# Patient Record
Sex: MALE | Race: WHITE | NOT HISPANIC OR LATINO | Employment: FULL TIME | URBAN - METROPOLITAN AREA
[De-identification: names, ages, dates, MRNs, and addresses within clinical notes are randomized per-mention and may not be internally consistent; named-entity substitution may affect disease eponyms.]

---

## 2024-01-28 ENCOUNTER — APPOINTMENT (OUTPATIENT)
Dept: RADIOLOGY | Facility: HOSPITAL | Age: 49
End: 2024-01-28
Payer: COMMERCIAL

## 2024-01-28 ENCOUNTER — HOSPITAL ENCOUNTER (EMERGENCY)
Facility: HOSPITAL | Age: 49
Discharge: HOME | End: 2024-01-28
Payer: COMMERCIAL

## 2024-01-28 VITALS
RESPIRATION RATE: 16 BRPM | OXYGEN SATURATION: 99 % | DIASTOLIC BLOOD PRESSURE: 99 MMHG | SYSTOLIC BLOOD PRESSURE: 146 MMHG | HEART RATE: 61 BPM

## 2024-01-28 DIAGNOSIS — S82.832A CLOSED AVULSION FRACTURE OF DISTAL END OF LEFT FIBULA, INITIAL ENCOUNTER: Primary | ICD-10-CM

## 2024-01-28 PROCEDURE — 73610 X-RAY EXAM OF ANKLE: CPT | Mod: LEFT SIDE | Performed by: RADIOLOGY

## 2024-01-28 PROCEDURE — 73610 X-RAY EXAM OF ANKLE: CPT | Mod: LT

## 2024-01-28 PROCEDURE — 99283 EMERGENCY DEPT VISIT LOW MDM: CPT

## 2024-01-28 ASSESSMENT — PAIN SCALES - GENERAL
PAINLEVEL_OUTOF10: 0 - NO PAIN
PAINLEVEL_OUTOF10: 5 - MODERATE PAIN

## 2024-01-28 ASSESSMENT — PAIN DESCRIPTION - LOCATION: LOCATION: ANKLE

## 2024-01-28 ASSESSMENT — PAIN - FUNCTIONAL ASSESSMENT: PAIN_FUNCTIONAL_ASSESSMENT: 0-10

## 2024-01-28 ASSESSMENT — COLUMBIA-SUICIDE SEVERITY RATING SCALE - C-SSRS
2. HAVE YOU ACTUALLY HAD ANY THOUGHTS OF KILLING YOURSELF?: NO
1. IN THE PAST MONTH, HAVE YOU WISHED YOU WERE DEAD OR WISHED YOU COULD GO TO SLEEP AND NOT WAKE UP?: NO
6. HAVE YOU EVER DONE ANYTHING, STARTED TO DO ANYTHING, OR PREPARED TO DO ANYTHING TO END YOUR LIFE?: NO

## 2024-01-28 ASSESSMENT — PAIN DESCRIPTION - ORIENTATION: ORIENTATION: LEFT

## 2024-01-28 NOTE — Clinical Note
Romeo Elias was seen and treated in our emergency department on 1/28/2024.  He may return to work on 01/30/2024.  Light duty.  Wear orthopedic boot with limited weightbearing activity.     If you have any questions or concerns, please don't hesitate to call.      Marco A Stephen PA-C

## 2024-01-28 NOTE — Clinical Note
Romeo Eilas was seen and treated in our emergency department on 1/28/2024.  He may return to work on 01/30/2024.  Light duty.  Wear orthopedic boot with limited weightbearing activity.     If you have any questions or concerns, please don't hesitate to call.      Marco A Stephen PA-C

## 2024-01-28 NOTE — ED PROVIDER NOTES
HPI   Chief Complaint   Patient presents with    Ankle Pain     Pt states that he fell out of the truck yesterday at work and hurt his left ankle. Pt rated his pain a 5-7/10 and 600mg of motrin at ~04:30. Msp's intact, no radiation        48-year-old male presents with left ankle injury.  Occurred yesterday.  Getting out of the truck rolled ankle.  Pain lateral aspect of the ankle.  He is ambulatory.  No other complaints of pain or injury.      History provided by:  Patient   used: No                        Germain Coma Scale Score: 15                  Patient History   No past medical history on file.  No past surgical history on file.  No family history on file.  Social History     Tobacco Use    Smoking status: Not on file    Smokeless tobacco: Not on file   Substance Use Topics    Alcohol use: Not on file    Drug use: Not on file       Physical Exam   ED Triage Vitals [01/28/24 0710]   Temp Heart Rate Respirations BP   -- 84 16 (!) 157/99      Pulse Ox Temp src Heart Rate Source Patient Position   99 % -- Monitor Standing      BP Location FiO2 (%)     Left arm --       Physical Exam  Vitals and nursing note reviewed.   Constitutional:       General: He is not in acute distress.     Appearance: Normal appearance. He is normal weight. He is not ill-appearing, toxic-appearing or diaphoretic.   HENT:      Head: Normocephalic.   Cardiovascular:      Rate and Rhythm: Normal rate.      Pulses: Normal pulses.   Pulmonary:      Effort: Pulmonary effort is normal.   Musculoskeletal:         General: Swelling and tenderness present. No deformity.      Left lower leg: Normal. No edema.      Left ankle: Swelling present. No deformity, ecchymosis or lacerations. Tenderness present over the lateral malleolus. No medial malleolus tenderness. Normal range of motion. Normal pulse.      Left Achilles Tendon: Normal.      Left foot: Normal.   Skin:     General: Skin is warm and dry.      Capillary Refill:  Capillary refill takes less than 2 seconds.      Findings: No bruising, erythema, lesion or rash.   Neurological:      General: No focal deficit present.      Mental Status: He is alert and oriented to person, place, and time.   Psychiatric:         Mood and Affect: Mood normal.         Behavior: Behavior normal.         ED Course & MDM   ED Course as of 01/28/24 0822   Sun Jan 28, 2024   0804 X-ray report reviewed.  Small avulsion fracture distal fibula [GA]      ED Course User Index  [GA] Marco A Stephen PA-C         Diagnoses as of 01/28/24 0822   Closed avulsion fracture of distal end of left fibula, initial encounter     XR ankle left 3+ views   Final Result   Nondisplaced avulsion fracture fibular tip left ankle with associated   lateral malleolar soft tissue swelling.             MACRO:   None        Signed by: Meron Cleveland 1/28/2024 7:40 AM   Dictation workstation:   [id number]          Medical Decision Making  Left ankle injury: Sprain strain fracture dislocation  X-ray: Nondisplaced small avulsion fracture tip of the lateral malleolus.  Soft tissue swelling.  Patient had taken ibuprofen prior to arrival.    Evaluation consistent with avulsion fracture tip of distal fibula with lateral soft tissue swelling left ankle.  Ortho boot, crutches if needed  Outpatient referrals for orthopedics and occupational health services.  Normal neurovascular exam.  Stable for discharge outpatient management.            Amount and/or Complexity of Data Reviewed  Radiology: ordered. Decision-making details documented in ED Course.        Procedure  Procedures     Marco A Stephen PA-C  01/28/24 7604

## 2024-02-01 ENCOUNTER — TELEPHONE (OUTPATIENT)
Dept: ORTHOPEDIC SURGERY | Facility: HOSPITAL | Age: 49
End: 2024-02-01
Payer: COMMERCIAL

## 2024-02-01 NOTE — TELEPHONE ENCOUNTER
Anat asked me to call patient and try to get them rescheduled with a foot and ankle provider, as she doesn't typically see ankles.  Had to leave a voicemail explaining that to the patient. Left both our office number and the central scheduling number along with the names of Dr. Cooley and Dr. Canales to call and try to get scheduled with one of them. CT

## 2024-02-02 ENCOUNTER — APPOINTMENT (OUTPATIENT)
Dept: ORTHOPEDIC SURGERY | Facility: HOSPITAL | Age: 49
End: 2024-02-02
Payer: COMMERCIAL

## 2024-02-07 ENCOUNTER — HOSPITAL ENCOUNTER (OUTPATIENT)
Dept: RADIOLOGY | Facility: CLINIC | Age: 49
Discharge: HOME | End: 2024-02-07
Payer: COMMERCIAL

## 2024-02-07 ENCOUNTER — OFFICE VISIT (OUTPATIENT)
Dept: ORTHOPEDIC SURGERY | Facility: CLINIC | Age: 49
End: 2024-02-07
Payer: COMMERCIAL

## 2024-02-07 VITALS — WEIGHT: 212 LBS | BODY MASS INDEX: 30.35 KG/M2 | HEIGHT: 70 IN

## 2024-02-07 DIAGNOSIS — M25.572 LEFT ANKLE PAIN, UNSPECIFIED CHRONICITY: ICD-10-CM

## 2024-02-07 DIAGNOSIS — S93.492A SPRAIN OF ANTERIOR TALOFIBULAR LIGAMENT OF LEFT ANKLE, INITIAL ENCOUNTER: Primary | ICD-10-CM

## 2024-02-07 PROCEDURE — 73610 X-RAY EXAM OF ANKLE: CPT | Mod: LEFT SIDE | Performed by: RADIOLOGY

## 2024-02-07 PROCEDURE — 73610 X-RAY EXAM OF ANKLE: CPT | Mod: LT

## 2024-02-07 PROCEDURE — L1902 AFO ANKLE GAUNTLET PRE OTS: HCPCS | Performed by: ORTHOPAEDIC SURGERY

## 2024-02-07 PROCEDURE — 99204 OFFICE O/P NEW MOD 45 MIN: CPT | Performed by: ORTHOPAEDIC SURGERY

## 2024-02-07 ASSESSMENT — PAIN DESCRIPTION - DESCRIPTORS: DESCRIPTORS: ACHING

## 2024-02-07 ASSESSMENT — PAIN SCALES - GENERAL: PAINLEVEL_OUTOF10: 3

## 2024-02-07 ASSESSMENT — PAIN - FUNCTIONAL ASSESSMENT: PAIN_FUNCTIONAL_ASSESSMENT: 0-10

## 2024-02-07 NOTE — PROGRESS NOTES
48-year-old male admitted fell out of his truck back on January 27.  He rolled his left ankle.  He was able to walk after that and finished his shift.  He noted increased pain in his ankle at the end of his shift in the morning and went to [facility].  He was evaluated at that time.  He was placed into a boot.  Pain has improved.  No previous injury to his ankle.  May be some athletic injuries but never requiring any treatment.  Pain is much improved.    On exam:  WD/WN athletic male  A+O X3  NAD  No lymphedema  Inspection of both feet and ankles show symmetric arches.   Able to STR bilaterally.   Mild tenderness over lateral gutter.  No crepitus.  5/5 strength in all 4 planes.   Sensation intact to LT.   Good pulses.   Stable anterior drawer.  No peroneal subluxation.    (-) [place].     I personally reviewed the following radiographic exams: Injury x-ray unremarkable.  X-ray report suggest small avulsion fracture.  Repeat x-rays today shows no fracture.  Normal joint space.  No OCD.    Assessment: Left ankle sprain.    Plan: Discussed nonoperative and operative options in detail.   Risk and benefits discussed in detail. All questions answered today.  Recovery timeline and expectations discussed in detail.  Can transition from boot.  Will apply for therapy.  Can try ankle brace for support as he transitions into supportive shoe wear.  Follow-up in 2 weeks.  Can return to work when he regains full strength and flexibility.

## 2024-02-08 ENCOUNTER — APPOINTMENT (OUTPATIENT)
Dept: ORTHOPEDIC SURGERY | Facility: CLINIC | Age: 49
End: 2024-02-08
Payer: COMMERCIAL

## 2024-02-21 ENCOUNTER — EVALUATION (OUTPATIENT)
Dept: PHYSICAL THERAPY | Facility: CLINIC | Age: 49
End: 2024-02-21
Payer: COMMERCIAL

## 2024-02-21 DIAGNOSIS — S93.492A SPRAIN OF ANTERIOR TALOFIBULAR LIGAMENT OF LEFT ANKLE, INITIAL ENCOUNTER: ICD-10-CM

## 2024-02-21 DIAGNOSIS — S93.492A SPRAIN OF OTHER LIGAMENT OF LEFT ANKLE, INITIAL ENCOUNTER: ICD-10-CM

## 2024-02-21 PROCEDURE — 97110 THERAPEUTIC EXERCISES: CPT | Mod: GP | Performed by: PHYSICAL THERAPIST

## 2024-02-21 PROCEDURE — 97161 PT EVAL LOW COMPLEX 20 MIN: CPT | Mod: GP | Performed by: PHYSICAL THERAPIST

## 2024-02-21 ASSESSMENT — ENCOUNTER SYMPTOMS
LOSS OF SENSATION IN FEET: 0
DEPRESSION: 0
OCCASIONAL FEELINGS OF UNSTEADINESS: 0

## 2024-02-21 NOTE — PROGRESS NOTES
Physical Therapy    Physical Therapy Evaluation    Patient Name: Romeo Elias  MRN: [id number]  Today's Date: 02/21/24  Time Calculation  Start Time: 0145  Stop Time: 0230  Time Calculation (min): 45 min     Insurance:  Visit number: 1 of 12  Insurance Type: Payor: THREE HAB / Plan: THREE HAB / Product Type: *No Product type* /   Authorization or Plan of Care date Range: 2/9/24 to 4/12/24    Therapy diagnoses:   1. Sprain of anterior talofibular ligament of left ankle, initial encounter  Referral to Physical Therapy    Follow Up In Physical Therapy      2. Sprain of other ligament of left ankle, initial encounter  PT eval and treat    Follow Up In Physical Therapy         General:  Reason for visit: L ankle sprain/avulsion fracture DOI: 1/27/24   Referred by: MD Ebonie Miner MD appt: none scheduled      Precautions:  none  Fall Risk: Low     Assessment:   Patient presents s/p healing ankle fracture/sprain. Will benefit from skilled PT in order to decrease pain and improve function to return to work full duty    Impairments: Pain, Active ROM, Passive ROM, Strength, Activity limitations, Flexibility, Joint mobility, Decreased functional level, and Participation restrictions    Functional Limitations: Walking, Stair negotiation, Working, and Standing    Recommended Treatment:  Therapeutic exercise, Manual therapy, Gait training, Home program instruction and progression, Neuromuscular re-education, Therapeutic activities, Self care and home management, Instruction in activity modification, Electrical stimulation, and Vasopneumatic device + cold      Plan:  Plan of care was developed with input and agreement by the patient.  2x per week for 4-6 weeks    Rehab Potential: Good to achieve goals.    Goals:  - Patient to be Indep in [facility] for self management of symptoms    - LEFS: 70/80 to indicate a significant improvement in overall function.      - Patient will demo 5/5 ankle strength (all planes) to allow for correct  "gait and stair mechanics     - Patient will demo mild to no limitation AROM left ankle to allow for correct mechanics with functional mobility.    - Patient to demonstrate gait with least restrictive device for all ADLS and does not demonstrate significant deviations that may contribute to discomfort in the future    - Patient to negotiate stairs reciprocally and descend with near equal eccentric control without use of hand rail.     - Improve Timed Single leg Stance by 8 seconds (MDC = 8 seconds)     Subjective:  - CC:  L lateral ankle pain.   - JOHNATHAN:  rolled ankle falling out of truck  - DOI/onset: 1/27/24  - PAIN - Location: lateral ankle  Worst(past 24 hours): 1-2/10   - PAIN - Alleviating: ice, NSAID's as needed  Aggravating: walking and stair climbing  - CURRENT MEDICAL MANAGEMENT: no medications  - PLOF: no prior injuries   - FUNCTIONAL LIMITATIONS: walking, stair climbing, prior work duties   - LIVING ENVIRONMENT: no barriers  - WORK: , off duty  - EXERCISE: walking dogs  - Patient stated goal: return to work    Medical History Form: Reviewed (scanned into chart)       Objective:   GAIT: Independent. no device Mildly antalgic  STAIRS:   Ascends and descends stairs Independently.    PALPATION: Lateral malleolus tenderness    SENSATION: Intact      ROM:  ANKLE ROM: 0 degrees dorsiflexion  40 degrees plantarflexion  30 degrees inversion  12 degrees eversion    FLEXIBILITY:  Gastroc R/L: WFL / Moderate limitation     STRENGTH:  Manual Muscle Test Ankle: Dorsiflexion R/L: 5 / 4  Plantarflexion R/L: 5 / 4  Eversion R/L: 5 / 4  Inversion R/L: 5 / 4    BALANCE  10s SLS on stable surface eyes open    Outcome Measures:  Other Measures  Lower Extremity Funtional Score (LEFS): 39/80     Treatment Performed: (\"NP\" = Not Performed)     HEP: Access Code: [email or web address]    Therapeutic Exercise:     15 minutes  Access Code: [email or web address]  URL: [email or web address]  Date: 02/21/2024  Prepared by: Domenico" Nan    Exercises  - Long Sitting Calf Stretch with Strap  - 1 x daily - 7 x weekly - 3 reps - 30 hold  - Standing Gastroc Stretch on Step  - 1 x daily - 7 x weekly - 3 reps - 30 hold  - Long Sitting Ankle Dorsiflexion with Anchored Resistance  - 1 x daily - 7 x weekly - 2 sets - 10 reps  - Long Sitting Ankle Plantar Flexion with Resistance  - 1 x daily - 7 x weekly - 2 sets - 10 reps  - Long Sitting Ankle Eversion with Resistance  - 1 x daily - 7 x weekly - 2 sets - 10 reps  - Long Sitting Ankle Inversion with Resistance  - 1 x daily - 7 x weekly - 2 sets - 10 reps  - Ankle Inversion Eversion Towel Slide  - 1 x daily - 7 x weekly - 2 sets - 10 reps  - Standing Ankle Dorsiflexion Stretch on Chair  - 1 x daily - 7 x weekly - 3 reps - 30 hold  - Isometric Ankle Inversion  - 1 x daily - 7 x weekly - 2 sets - 10 reps - 5 hold  - Isometric Ankle Eversion at Wall  - 1 x daily - 7 x weekly - 2 sets - 10 reps - 5 hold    Manual Therapy:       minutes      Neuromuscular Re-education:      minutes      Gait Training:            minutes      Therapeutic Activity:      minutes      Modalities:       Vasopneumatic Device       minutes  Electrical Stimulation          minutes  Ultrasound            minutes  Iontophoresis                     minutes  Cold Pack            minutes  Mechanical Traction           minutes    Evaluation Complexity: Low: 30 minutes; Moderate   minutes; Complex PT Evaluation Time Entry: 30;  minutes    Re-Evaluation:   minutes

## 2024-02-22 ENCOUNTER — APPOINTMENT (OUTPATIENT)
Dept: ORTHOPEDIC SURGERY | Facility: CLINIC | Age: 49
End: 2024-02-22
Payer: COMMERCIAL

## 2024-02-23 ENCOUNTER — TREATMENT (OUTPATIENT)
Dept: PHYSICAL THERAPY | Facility: CLINIC | Age: 49
End: 2024-02-23
Payer: COMMERCIAL

## 2024-02-23 DIAGNOSIS — S93.492A SPRAIN OF OTHER LIGAMENT OF LEFT ANKLE, INITIAL ENCOUNTER: ICD-10-CM

## 2024-02-23 DIAGNOSIS — S93.492A SPRAIN OF ANTERIOR TALOFIBULAR LIGAMENT OF LEFT ANKLE, INITIAL ENCOUNTER: ICD-10-CM

## 2024-02-23 PROCEDURE — 97110 THERAPEUTIC EXERCISES: CPT | Mod: GP | Performed by: PHYSICAL THERAPIST

## 2024-02-23 NOTE — PROGRESS NOTES
PHYSICAL THERAPY TREATMENT NOTE    Patient Name:  Romeo Elias   Patient MRN: [id number]  Date: 02/23/24  Time Calculation  Start Time: 1040  Stop Time: 1120  Time Calculation (min): 40 min    Insurance:  Visit number: 2 of 12  Insurance Type: Payor: [employer] HAB / Plan: THREE HAB / Product Type: *No Product type* /   Authorization or Plan of Care date Range: 2/9/24 to 4/12/24    Therapy diagnoses:   1. Sprain of anterior talofibular ligament of left ankle, initial encounter  Follow Up In Physical Therapy      2. Sprain of other ligament of left ankle, initial encounter  Follow Up In Physical Therapy         General:  Reason for visit: L ankle avulsion fracture/sprain DOI: 1/27/24   Referred by: Nader Vizcarra MD  Next MD appt:  none scheduled     Precautions:  none  Fall Risk: Low    Assessment:  Education: Reviewed home exercise program. Provided manual cues for correct performance of exercises. Provided verbal feedback to improve exercise technique.  Progress towards functional goals: Improved gait quality. Reduced pain level.  Response to interventions: Patient tolerated therapeutic interventions well and without any adverse events. Tolerated treatment session well without any increase in pain.  Justification for continued skilled care: To address remaining functional, objective and subjective deficits to allow them to return to full independence with ADLs. Modify and progress home exercise program. Progressive strengthening to stabilize the ankle to improve function.    Plan:  Exercises targeting surrounding musculature to stabilize the joint and improve function. Exercises to gradually increase flexibility and range of motion of the ankle. Manual therapy to improve joint mobility.    Subjective:   Romeo reports he feels like his condition is improving.  Doing well with initial HEP.   Pain  "(0-10): 1    HEP adherence / understanding: compliance with the instructed home exercises.    Objective:  Mildly antalgic with gait    Treatment Performed: (\"NP\" = Not Performed)     HEP: Access Code: [email or web address]     Therapeutic Exercise:     40 minutes  Stationary bike 5 min  Standing calf stretch 30s x 3  Foam balance tandem and SLS 30s x 3 ea  Standing contrakicks G TB x 20 ea  Sidestepping on foam 5 laps  BOSU mini lunge 2x10 F/L  Long sitting ankle TB 4 way  Performed a few min of ankle stretching in long sitting and joint distraction    Manual Therapy:       minutes      Neuromuscular Re-education:      minutes      Therapeutic Activity:      minutes      Gait Training:            minutes      Modalities:       Vasopneumatic Device       minutes  Electrical Stimulation          minutes  Ultrasound            minutes  Iontophoresis                     minutes  Cold Pack            minutes  Mechanical Traction           minutes    Evaluation Complexity: Low:   minutes; Moderate   minutes; Complex   minutes    Re-Evaluation:   minutes           "

## 2024-02-26 ENCOUNTER — APPOINTMENT (OUTPATIENT)
Dept: PHYSICAL THERAPY | Facility: CLINIC | Age: 49
End: 2024-02-26
Payer: COMMERCIAL

## 2024-02-26 ENCOUNTER — TREATMENT (OUTPATIENT)
Dept: PHYSICAL THERAPY | Facility: CLINIC | Age: 49
End: 2024-02-26
Payer: COMMERCIAL

## 2024-02-26 DIAGNOSIS — S93.492A SPRAIN OF ANTERIOR TALOFIBULAR LIGAMENT OF LEFT ANKLE, INITIAL ENCOUNTER: ICD-10-CM

## 2024-02-26 DIAGNOSIS — S93.492A SPRAIN OF OTHER LIGAMENT OF LEFT ANKLE, INITIAL ENCOUNTER: ICD-10-CM

## 2024-02-26 PROCEDURE — 97110 THERAPEUTIC EXERCISES: CPT | Mod: GP | Performed by: PHYSICAL THERAPIST

## 2024-02-26 NOTE — PROGRESS NOTES
"                                                                                                                         PHYSICAL THERAPY TREATMENT NOTE    Patient Name:  Romeo Elias   Patient MRN: [id number]  Date: 02/26/24  Time Calculation  Start Time: 1200  Stop Time: 1245  Time Calculation (min): 45 min    Insurance:  Visit number: 3 of 12  Insurance Type: Payor: THREE HAB / Plan: THREE HAB / Product Type: *No Product type* /   Authorization or Plan of Care date Range: 2/9/24 to 4/12/24    Therapy diagnoses:   1. Sprain of anterior talofibular ligament of left ankle, initial encounter  Follow Up In Physical Therapy      2. Sprain of other ligament of left ankle, initial encounter  Follow Up In Physical Therapy         General:  Reason for visit: L ankle avulsion fracture/sprain DOI: 1/27/24   Referred by: Nader Vizcarra MD  Next MD appt:  none scheduled     Precautions:  none  Fall Risk: Low    Assessment:  Education: Provided verbal feedback to improve exercise technique.  Progress towards functional goals: Reduced frequency of pain. Reduced intensity of pain.  Response to interventions: Patient tolerated therapeutic interventions well and without any adverse events. Tolerated treatment session well without any increase in pain. Patient was appropriately fatigued with no complaints.  Justification for continued skilled care: To address remaining functional, objective and subjective deficits to allow them to return to full independence with ADLs. Progressive strengthening to stabilize the ankle to improve function.    Plan:  Exercises targeting surrounding musculature to stabilize the joint and improve function.    Subjective:   Romeo reports he feels like his condition is improving.  Most discomfort top of the ankle, better on the sides   Pain (0-10): 1    HEP adherence / understanding: compliance with the instructed home exercises.    Objective:  Mildly antalgic with gait    Treatment Performed: (\"NP\" = Not " Performed)     HEP: Access Code: [email or web address]     Therapeutic Exercise:     45 minutes  Stationary bike 6 min  Standing calf stretch 30s x 3  Foam balance tandem and SLS 30s x 3 ea  Standing contrakicks R TB x 20 ea with foam  Sidestepping on foam 5 laps  BOSU mini lunge 2x10 F/L  BOSU F/L step up x 20 ea  Long sitting ankle TB 4 way  Performed a few min of ankle stretching in long sitting and joint distraction    Manual Therapy:       minutes      Neuromuscular Re-education:      minutes      Therapeutic Activity:      minutes      Gait Training:            minutes      Modalities:       Vasopneumatic Device       minutes  Electrical Stimulation          minutes  Ultrasound            minutes  Iontophoresis                     minutes  Cold Pack            minutes  Mechanical Traction           minutes    Evaluation Complexity: Low:   minutes; Moderate   minutes; Complex   minutes    Re-Evaluation:   minutes

## 2024-02-28 ENCOUNTER — TREATMENT (OUTPATIENT)
Dept: PHYSICAL THERAPY | Facility: CLINIC | Age: 49
End: 2024-02-28
Payer: COMMERCIAL

## 2024-02-28 DIAGNOSIS — S93.492A SPRAIN OF ANTERIOR TALOFIBULAR LIGAMENT OF LEFT ANKLE, INITIAL ENCOUNTER: ICD-10-CM

## 2024-02-28 DIAGNOSIS — S93.492A SPRAIN OF OTHER LIGAMENT OF LEFT ANKLE, INITIAL ENCOUNTER: ICD-10-CM

## 2024-02-28 PROCEDURE — 97110 THERAPEUTIC EXERCISES: CPT | Mod: GP | Performed by: PHYSICAL THERAPIST

## 2024-02-28 NOTE — PROGRESS NOTES
"                                                                                                                         PHYSICAL THERAPY TREATMENT NOTE    Patient Name:  Romeo Elias   Patient MRN: [id number]  Date: 02/28/24  Time Calculation  Start Time: 0145  Stop Time: 0230  Time Calculation (min): 45 min    Insurance:  Visit number: 4 of 12  Insurance Type: Payor: [employer] HAB / Plan: THREE HAB / Product Type: *No Product type* /   Authorization or Plan of Care date Range: 2/9/24 to 4/12/24    Therapy diagnoses:   1. Sprain of anterior talofibular ligament of left ankle, initial encounter  Follow Up In Physical Therapy      2. Sprain of other ligament of left ankle, initial encounter  Follow Up In Physical Therapy           General:  Reason for visit: L ankle avulsion fracture/sprain DOI: 1/27/24   Referred by: MD Ebonie Miner MD appt:  none scheduled     Precautions:  none  Fall Risk: Low    Assessment:  Education: Provided verbal feedback to improve exercise technique.  Progress towards functional goals: Improved gait quality. Reduced pain level.  Response to interventions: Tolerated treatment session well without any increase in pain. Improved tolerance to strengthening progressions.  Justification for continued skilled care: Progressive strengthening to stabilize the ankle to improve function.    Plan:  Exercises targeting surrounding musculature to stabilize the joint and improve function.    Subjective:   Romeo reports he feels like his condition is improving.     Pain (0-10): 0    HEP adherence / understanding: compliance with the instructed home exercises.    Objective:  Mildly antalgic with gait    Treatment Performed: (\"NP\" = Not Performed)     HEP: Access Code: [email or web address]     Therapeutic Exercise:     45 minutes  Stationary bike 6 min  Standing calf stretch 30s x 3  SLS x10 ea 3 way with ball toss  Standing contrakicks G TB x 20 ea with foam  Sidestepping/monster walk B TB 2 laps ea  BOSU mini " lunge 2x10 F/L  BOSU F/L step up x 20 ea  Long sitting ankle TB 4 way-NP  Performed a few min of ankle stretching in long sitting and joint distraction    Manual Therapy:       minutes      Neuromuscular Re-education:      minutes      Therapeutic Activity:      minutes      Gait Training:            minutes      Modalities:       Vasopneumatic Device       minutes  Electrical Stimulation          minutes  Ultrasound            minutes  Iontophoresis                     minutes  Cold Pack            minutes  Mechanical Traction           minutes    Evaluation Complexity: Low:   minutes; Moderate   minutes; Complex   minutes    Re-Evaluation:   minutes

## 2024-03-05 ENCOUNTER — TREATMENT (OUTPATIENT)
Dept: PHYSICAL THERAPY | Facility: CLINIC | Age: 49
End: 2024-03-05
Payer: COMMERCIAL

## 2024-03-05 DIAGNOSIS — S93.492A SPRAIN OF OTHER LIGAMENT OF LEFT ANKLE, INITIAL ENCOUNTER: ICD-10-CM

## 2024-03-05 DIAGNOSIS — S93.492A SPRAIN OF ANTERIOR TALOFIBULAR LIGAMENT OF LEFT ANKLE, INITIAL ENCOUNTER: ICD-10-CM

## 2024-03-05 PROBLEM — S93.402A SPRAIN OF LEFT ANKLE: Status: ACTIVE | Noted: 2024-03-05

## 2024-03-05 PROCEDURE — 97110 THERAPEUTIC EXERCISES: CPT | Mod: GP,CQ

## 2024-03-05 NOTE — PROGRESS NOTES
"                                                                                                                         PHYSICAL THERAPY TREATMENT NOTE    Patient Name:  Romeo Elias   Patient MRN: [id number]  Date: 03/05/24       Insurance:  Visit number: 4 of 12  Insurance Type: Payor: [employer] HAB / Plan: THREE HAB / Product Type: *No Product type* /   Authorization or Plan of Care date Range: 2/9/24 to 4/12/24    Therapy diagnoses:   1. Sprain of anterior talofibular ligament of left ankle, initial encounter  Follow Up In Physical Therapy      2. Sprain of other ligament of left ankle, initial encounter  Follow Up In Physical Therapy           General:  Reason for visit: L ankle avulsion fracture/sprain DOI: 1/27/24   Referred by: MD Ebonie Miner MD appt:  none scheduled     Precautions:  none  Fall Risk: Low    Assessment:  Education: Provided verbal feedback to improve exercise technique.  Progress towards functional goals: Improved gait quality. Reduced pain level.  Response to interventions: Tolerated treatment session well without any increase in pain. Improved tolerance to strengthening progressions.Stability exercises presented good challenge to pt. , needed to reach for renetta bar at times though no LOB  Justification for continued skilled care: Progressive strengthening to stabilize the ankle to improve function.    Plan:  Exercises targeting surrounding musculature to stabilize the joint and improve function.    Subjective:   Romeo reports he feels like his condition is improving. Did have some pain a couple of days ago at a 2, today is a 1.   Pain (0-10): 1    HEP adherence / understanding: compliance with the instructed home exercises.    Objective:  Normal gait    Treatment Performed: (\"NP\" = Not Performed)     HEP: Access Code: [email or web address]     Therapeutic Exercise:       minutes  Stationary bike 6 min  Standing calf stretch 30s x 3  SLS x10 ea 3 way with ball toss  Standing L SLB contra kicks  x " 20 ea with foam  Sidestepping/monster walk B TB 2 laps ea  BOSU mini lunge 2x10 F/L NP  BOSU F step up x 20 ea with RLE pull up  BOSU lateral step up over 20x   BOSU black side up  squats 20x w/light UE support   Long sitting ankle GTB 4 way 10x each   Shuttle LLE only 50 # 3 x 12  Shuttle LLE calf raise  25 #  x 12  Performed a few min of ankle stretching in long sitting and joint distraction np    Manual Therapy:       minutes      Education : Updated HEP , issued green tband

## 2024-03-08 ENCOUNTER — TREATMENT (OUTPATIENT)
Dept: PHYSICAL THERAPY | Facility: CLINIC | Age: 49
End: 2024-03-08
Payer: COMMERCIAL

## 2024-03-08 DIAGNOSIS — S93.492A SPRAIN OF ANTERIOR TALOFIBULAR LIGAMENT OF LEFT ANKLE, INITIAL ENCOUNTER: ICD-10-CM

## 2024-03-08 DIAGNOSIS — S93.492A SPRAIN OF OTHER LIGAMENT OF LEFT ANKLE, INITIAL ENCOUNTER: ICD-10-CM

## 2024-03-08 PROCEDURE — 97110 THERAPEUTIC EXERCISES: CPT | Mod: GP | Performed by: PHYSICAL THERAPIST

## 2024-03-08 NOTE — PROGRESS NOTES
"                                                                                                                         PHYSICAL THERAPY TREATMENT NOTE    Patient Name:  Romeo Elias   Patient MRN: [id number]  Date: 03/08/24  Time Calculation  Start Time: 1000  Stop Time: 1040  Time Calculation (min): 40 min    Insurance:  Visit number: 6 of 12  Insurance Type: Payor: [employer] / Plan: [employer] / Product Type: *No Product type* /   Authorization or Plan of Care date Range: 2/9/24 to 4/12/24    Therapy diagnoses:   1. Sprain of anterior talofibular ligament of left ankle, initial encounter  Follow Up In Physical Therapy      2. Sprain of other ligament of left ankle, initial encounter  Follow Up In Physical Therapy        General:  Reason for visit: L ankle avulsion fracture/sprain DOI: 1/27/24   Referred by: Nader Vizcarra MD  Next MD appt:  none scheduled     Precautions:  none  Fall Risk: Low    Assessment:  Education: Provided verbal feedback to improve exercise technique.  Progress towards functional goals: Improved gait quality. Improved walking distance. Reduced frequency of pain. Reduced intensity of pain.  Response to interventions: Tolerated treatment session well without any increase in pain. Improved tolerance to strengthening progressions.  Justification for continued skilled care: To address remaining functional, objective and subjective deficits to allow them to return to full independence with ADLs.    Plan:  Exercises targeting surrounding musculature to stabilize the joint and improve function.    Subjective:   Romeo reports he feels like his condition is improving.     Pain (0-10): 0    HEP adherence / understanding: compliance with the instructed home exercises.      Objective:  Normal gait    Treatment Performed: (\"NP\" = Not Performed)     HEP: Access Code: [email or web address]     Therapeutic Exercise:     40 minutes  Stationary bike 6 min  Standing calf stretch 30s x 3  SLS x10 ea 3 way with ball " toss  Standing L SLB contra kicks  x 20 ea with foam-NP  Sidestepping/monster walk B TB 2 laps ea-NP  BOSU mini lunge 2x10 F/L  BOSU F/L step up x 20 ea with RLE pull up  BOSU black side up  squats 20x w/light UE support   Long sitting ankle GTB 4 way 10x each -NP  Shuttle LLE only 50 # 2 x 15  Shuttle LLE calf raise  25 #  2x10  5 position cone placement and  RDL 2x  Performed a few min of ankle stretching in long sitting and joint distraction     Manual Therapy:       minutes      Education : Updated HEP , issued green tband

## 2024-03-12 ENCOUNTER — APPOINTMENT (OUTPATIENT)
Dept: PHYSICAL THERAPY | Facility: CLINIC | Age: 49
End: 2024-03-12
Payer: COMMERCIAL

## 2024-03-19 ENCOUNTER — DOCUMENTATION (OUTPATIENT)
Dept: PHYSICAL THERAPY | Facility: CLINIC | Age: 49
End: 2024-03-19
Payer: COMMERCIAL

## 2024-03-19 ENCOUNTER — APPOINTMENT (OUTPATIENT)
Dept: PHYSICAL THERAPY | Facility: CLINIC | Age: 49
End: 2024-03-19
Payer: COMMERCIAL

## 2024-03-19 NOTE — PROGRESS NOTES
Physical Therapy                 Therapy Communication Note    Patient Name: Romeo Elias  MRN: [id number]  Today's Date: 3/19/2024     Discipline: Physical Therapy    Missed Visit Reason: no reason     Missed Time: Cancel    Comment:

## 2024-03-22 ENCOUNTER — TREATMENT (OUTPATIENT)
Dept: PHYSICAL THERAPY | Facility: CLINIC | Age: 49
End: 2024-03-22
Payer: COMMERCIAL

## 2024-03-22 DIAGNOSIS — S93.492A SPRAIN OF OTHER LIGAMENT OF LEFT ANKLE, INITIAL ENCOUNTER: ICD-10-CM

## 2024-03-22 DIAGNOSIS — S93.492A SPRAIN OF ANTERIOR TALOFIBULAR LIGAMENT OF LEFT ANKLE, INITIAL ENCOUNTER: ICD-10-CM

## 2024-03-22 PROCEDURE — 97110 THERAPEUTIC EXERCISES: CPT | Mod: GP | Performed by: PHYSICAL THERAPIST

## 2024-03-22 NOTE — PROGRESS NOTES
"                                                                                                                         PHYSICAL THERAPY TREATMENT NOTE    Patient Name:  Romeo Elias   Patient MRN: [id number]  Date: 03/22/24  Time Calculation  Start Time: 0830  Stop Time: 0910  Time Calculation (min): 40 min    Insurance:  Visit number: 7 of 12  Insurance Type: Payor: [employer] / Plan: [employer] / Product Type: *No Product type* /   Authorization or Plan of Care date Range: 2/9/24 to 4/12/24    Therapy diagnoses:   1. Sprain of anterior talofibular ligament of left ankle, initial encounter  Follow Up In Physical Therapy      2. Sprain of other ligament of left ankle, initial encounter  Follow Up In Physical Therapy        General:  Reason for visit: L ankle avulsion fracture/sprain DOI: 1/27/24   Referred by: MD Ebonie Miner MD appt:  none scheduled     Precautions:  none  Fall Risk: Low    Assessment:  Education: Reviewed home exercise program.  Progress towards functional goals: Improved gait quality. Improved ability to complete household activities. Reduced frequency of pain. Reduced intensity of pain.  Response to interventions: Tolerated treatment session well without any increase in pain. Improved tolerance to strengthening progressions.  Justification for continued skilled care: To address remaining functional, objective and subjective deficits to allow them to return to full independence with ADLs. Progressive strengthening to stabilize the ankle to improve function.    Plan:  Exercises targeting surrounding musculature to stabilize the joint and improve function.    Subjective:   Romeo reports he feels like his condition is improving.     Pain (0-10): 0    HEP adherence / understanding: compliance with the instructed home exercises.    Objective:  Normal gait    Treatment Performed: (\"NP\" = Not Performed)     HEP: Access Code: [email or web address]     Therapeutic Exercise:     40 minutes  Stationary bike 6 " min  Standing calf stretch 30s x 3  SLS x10 ea 3 way with ball toss  Standing L SLB contra kicks  x 20 ea with foam-NP  Sidestepping/monster walk B TB 2 laps ea-NP  BOSU mini lunge 2x10 F/L  BOSU F/L step up x 20 ea with RLE pull up  BOSU black side up  squats 20x w/light UE support   Long sitting ankle GTB 4 way 10x each -NP  Shuttle LLE only 50 # 2 x 15  Shuttle LLE calf raise  25 #  2x10  5 position cone placement and  RDL 2x  Performed a few min of ankle stretching in long sitting and joint distraction     Manual Therapy:       minutes

## 2024-03-26 ENCOUNTER — TREATMENT (OUTPATIENT)
Dept: PHYSICAL THERAPY | Facility: CLINIC | Age: 49
End: 2024-03-26
Payer: COMMERCIAL

## 2024-03-26 DIAGNOSIS — S93.492A SPRAIN OF OTHER LIGAMENT OF LEFT ANKLE, INITIAL ENCOUNTER: ICD-10-CM

## 2024-03-26 DIAGNOSIS — S93.492A SPRAIN OF ANTERIOR TALOFIBULAR LIGAMENT OF LEFT ANKLE, INITIAL ENCOUNTER: ICD-10-CM

## 2024-03-26 PROCEDURE — 97110 THERAPEUTIC EXERCISES: CPT | Mod: GP,CQ

## 2024-03-26 NOTE — PROGRESS NOTES
"                                                                                                                         PHYSICAL THERAPY TREATMENT NOTE    Patient Name:  Romeo Elias   Patient MRN: [id number]  Date: 03/26/24  Time Calculation  Start Time: 0830  Stop Time: 0915  Time Calculation (min): 45 min    Insurance:  Visit number: 7 of 12  Insurance Type: Payor: [employer] / Plan: [employer] / Product Type: *No Product type* /   Authorization or Plan of Care date Range: 2/9/24 to 4/12/24    Therapy diagnoses:   1. Sprain of anterior talofibular ligament of left ankle, initial encounter  Follow Up In Physical Therapy      2. Sprain of other ligament of left ankle, initial encounter  Follow Up In Physical Therapy        General:  Reason for visit: L ankle avulsion fracture/sprain DOI: 1/27/24   Referred by: MD Ebonie Miner MD appt:  none scheduled     Precautions:  none  Fall Risk: Low    Assessment:  Education: Reviewed home exercise program.  Progress towards functional goals: Improved gait quality. Improved ability to complete household activities. Reduced frequency of pain. Reduced intensity of pain.  Response to interventions: Tolerated treatment session well without any increase in pain. Improved tolerance to strengthening progressions.  Justification for continued skilled care: To address remaining functional, objective and subjective deficits to allow them to return to full independence with ADLs. Progressive strengthening to stabilize the ankle to improve function.    Plan:  Exercises targeting surrounding musculature to stabilize the joint and improve function.    Subjective:   Romeo reports he feels like his condition is improving.  Overall 90% improved since start of therapy.   Pain (0-10): 0    HEP adherence / understanding: compliance with the instructed home exercises.    Objective:  Normal gait    Treatment Performed: (\"NP\" = Not Performed)     HEP: Access Code: [email or web address]     Therapeutic " "Exercise:     45 minutes  Stationary bike 6 min  Standing calf stretch 30s x 3  SLS x10 ea 3 way with 4# ball forward push outs  10x , vertical push ups 10x ,rotation 10x each side  Standing L SLB contra kicks  x 20 ea with foam-NP  Sidestepping/monster walk B TB 2 laps ea-NP  BOSU mini lunge 2x10 F/L np  BOSU F/L step up x 20 ea with RLE pull up with 5# each hand   Dips 4\" 2 x 10   Forward heel tap 4\" 2 x 10   BOSU black side up  squats 20x w/light UE support   Cable walking 15#  4 way 5x each way  Long sitting ankle GTB 4 way 10x each -NP  Shuttle LLE only 56 # 2 x 15  Shuttle LLE calf raise  31 #  2x10  5 position cone placement and  RDL 2x  np  Performed a few min of ankle stretching in long sitting and joint distraction np    Manual Therapy:       minutes               "

## 2024-03-29 ENCOUNTER — TREATMENT (OUTPATIENT)
Dept: PHYSICAL THERAPY | Facility: CLINIC | Age: 49
End: 2024-03-29
Payer: COMMERCIAL

## 2024-03-29 DIAGNOSIS — S93.492A SPRAIN OF ANTERIOR TALOFIBULAR LIGAMENT OF LEFT ANKLE, INITIAL ENCOUNTER: ICD-10-CM

## 2024-03-29 DIAGNOSIS — S93.492A SPRAIN OF OTHER LIGAMENT OF LEFT ANKLE, INITIAL ENCOUNTER: ICD-10-CM

## 2024-03-29 PROCEDURE — 97110 THERAPEUTIC EXERCISES: CPT | Mod: GP | Performed by: PHYSICAL THERAPIST

## 2024-03-29 NOTE — PROGRESS NOTES
"                                                                                                                         PHYSICAL THERAPY TREATMENT NOTE    Patient Name:  Romeo Elias   Patient MRN: [id number]  Date: 03/29/24  Time Calculation  Start Time: 0830  Stop Time: 0910  Time Calculation (min): 40 min    Insurance:  Visit number: 8 of 12  Insurance Type: Payor: [employer] / Plan: [employer] / Product Type: *No Product type* /   Authorization or Plan of Care date Range: 2/9/24 to 4/12/24    Therapy diagnoses:   1. Sprain of anterior talofibular ligament of left ankle, initial encounter  Follow Up In Physical Therapy      2. Sprain of other ligament of left ankle, initial encounter  Follow Up In Physical Therapy        General:  Reason for visit: L ankle avulsion fracture/sprain DOI: 1/27/24   Referred by: MD Ebonie Miner MD appt:  none scheduled     Precautions:  none  Fall Risk: Low    Assessment:  Education: Reviewed home exercise program.  Progress towards functional goals: Improved gait quality. Improved walking distance. Reduced pain level.  Response to interventions: Tolerated treatment session well without any increase in pain.  Justification for continued skilled care: To address remaining functional, objective and subjective deficits to allow them to return to full independence with ADLs.    Plan:  Discharge from PT    Subjective:   Romeo reports he feels like his condition is improving.     Pain (0-10): 0    HEP adherence / understanding: compliance with the instructed home exercises.    Objective:  Normal gait, strength, and ROM    Treatment Performed: (\"NP\" = Not Performed)     HEP: Access Code: [email or web address]     Therapeutic Exercise:     40 minutes  Stationary bike 6 min  Standing calf stretch 30s x 3  SLS x10 ea 3 way with 4# ball forward push outs  10x , vertical push ups 10x ,rotation 10x each side  Standing L SLB contra kicks  x 20 ea with foam-NP  Sidestepping/monster walk B TB 2 laps " "ea-NP  BOSU mini lunge 2x10 F/L np  BOSU F/L step up x 20 ea with RLE pull up with 5# each hand   Dips 4\" 2 x 10   Forward heel tap 4\" 2 x 10   BOSU black side up  squats 20x w/light UE support   Cable walking 15#  4 way 5x each way  Long sitting ankle GTB 4 way 10x each -NP  Shuttle LLE only 75 # 2 x 15  Shuttle LLE calf raise  31 #  2x10  5 position cone placement and  RDL 2x  np  Performed a few min of ankle stretching in long sitting and joint distraction np    Manual Therapy:       minutes               "

## 2024-04-01 ENCOUNTER — APPOINTMENT (OUTPATIENT)
Dept: PHYSICAL THERAPY | Facility: CLINIC | Age: 49
End: 2024-04-01
Payer: COMMERCIAL

## 2025-07-19 ENCOUNTER — HOSPITAL ENCOUNTER (OUTPATIENT)
Dept: RADIOLOGY | Facility: HOSPITAL | Age: 50
Discharge: HOME | End: 2025-07-19
Payer: COMMERCIAL

## 2025-07-19 DIAGNOSIS — Z13.6 ENCOUNTER FOR SCREENING FOR CARDIOVASCULAR DISORDERS: ICD-10-CM

## 2025-07-19 PROCEDURE — 75571 CT HRT W/O DYE W/CA TEST: CPT
